# Patient Record
(demographics unavailable — no encounter records)

---

## 2024-10-31 NOTE — HISTORY OF PRESENT ILLNESS
[FreeTextEntry1] : Telemedicine visit: Patient Location at time of Video Visit: Dhara and her parents were home during the visit. Physician Location at time of Visit: [address] [place] [address] [address] [place] [address] Other Participants Present: None  I obtained parents consent and agreement for this video encounter. Additionally, I reviewed with the parents and addressed all questions regarding the disposition plan and follow-up instructions including any pertinent findings. The parents have acknowledged understanding of this information. I informed the parents that a copy of their after-visit summary for this visit is available for them to refer to within the secure patient portal.  CC: 6 y 1 mo old girl with ADHD, sleep difficulties,  and enuresis. Telemedicine follow up visit.  Interval history; Since last seen by primary Neurologist (Dr Pugh) Dhara has been doing much better. Parents refer that the morning dose of the generic liquid Methylphenidate has been working very well, but the afternoon dose seems to be little bit low (mom increased the dose and saw benefits). No side effects. Academically, Dhara is in 1st grade, mainstream classroom, 17:1:2 ratio. She rides the school bus for about 35 minutes. She is making new friends and acquiring academic skills. General health has been good. Mom refers that the enuresis is less frequent (she has been evaluated by Peds Urologist).  Sleep is still of concern. She falls asleep around 9 PM and wakes up around 7 AM but wakes up several nights a week (usually around 3 AM). Some nights, she needs a long time to fall asleep again. Parents refer that Melatonin helps her fall asleep but does not prevent her from waking up.  Current CNS medications Generic liquid Methylphenidate (5 mg/5ml) 2.5 ml QAM 3 ml at 3:30 PM Melatonin supplementation  Physical exam: Limited, on account of visit mode (video) Well nourished, non dysmorphic little girl in no distress Face is symmetric Neck has full range of motion. No torticollis or webbing Hair has normal consistency, appearance, distribution Awake, alert, good eye contact Speaks in sentences Follows simple commands well Fidgety Normal muscle bulk   No focal weakness No dysmetria No ataxia No abnormal movements Intact gait  Assessment: 6 y 1 mo old girl with ADHD, sleep difficulties,  and enuresis. Exhibiting improvements on generic liquid Methylphenidate, without side effects.  Plan: Dhara's televisit today had a duration of 30 minutes (>50% of which was spent in direct counseling and coordination of her care). I personally reviewed Dhara's medical history, medical records, tests results, recent developments, and then delineated next steps for her neurological care.  Dhara's parents and I reviewed ADHD in general, different treatment modalities, co morbidities and overall prognosis.   We reviewed generic liquid Methylphenidate's side effects profile. We discussed trying delayed release Melatonin supplementation to enhance sleep. 1)	Continue generic liquid Methylphenidate (5 mg/5 ml) at 2.5 ml QAM 3 ml at 3:30 PM 2)	Extended release/delayed release Melatonin 3)	Follow up 3 mo (with Dr Pugh)  Dhara's parents understand plan, agree and want to move forward. All of their questions were answered. Dhara's controlled substance history was obtained from the [place] state prescription monitoring program registry.  Loren Davila MD Pediatric Neurologist and Clinical Neurophysiologist Director Pediatric Epilepsy [facility] at [facility] Clinical Professor of Neurology and Pediatrics, [facility] at [facility]

## 2025-02-03 NOTE — HISTORY OF PRESENT ILLNESS
[FreeTextEntry1] : Telemedicine visit:   Patient Location at time of Video Visit:  Dhara and their parents were home during the visit.   Physician Location at time of Visit:  [address] [place] [address] [address] [place] [id number]    I obtained parent consent and agreement for this video encounter.   Additionally, I reviewed with the parent and addressed all questions regarding the disposition plan and follow-up instructions including any pertinent findings. The parent has acknowledged understanding of this information. I informed the parent that a copy of their after-visit summary for this visit is available for them to refer to within the secure patient portal.   This is a 6 year old girl with combination type ADHD and urinary incontinence here for follow up.   Interval Hx: Since last visit Dhara saw Dr. Davila and slightly uptitrated the Methylin, specifically afternoon dose to help with after school activities. Parents are overall happy with how she has been doing. School she is doing well. Dhara has been doing very well at Math and her teacher was giving her extra homework to help challenge her. They have been trying to implement routine at home which was initially more effective. She also has been doing [employer]) after school which helps her get energy out and emotions. They teach a lot of mindfulness there which helps Dhara. They are starting swimming again. Incontinence has also been improving significantly as well.  They have tried extended release melatonin which has helped a little. Dhara tends to wake up early 1-2 times a week and gets out of bed which is better than every night. They have tried a Tuney box Toe walking has improved, mom thinks it more on the right toe. No side effects from the medication but Dhara does complain about the flavor and texture of the liquid formulation.   Initial HPI: Parents state that Dhara has always had a lot of energy and has been a very active child. She is highly distractable, needs constant redirection for staying on task and repeated reminders. She is very hyperfocused on whatever she is playing with and is constantly doing something, to the point that she has frequent incontinence because she does not make it to the bathroom and sometimes does not realize she peed. They have seen urologist and GI for this. They saw a  but they are not able to take on as case for 6 months. They also saw a neurologist a few weeks ago but they did not jive well with him. They did an EEG but did not get the results yet.   Sleep: was previously waking up at 3 AM and playing with toys, now they have kept her up longer and she is sleeping though the night more frequently.   Past medical history:  admitted for RSV, constipation, enuresis    Allergies: NKDA  Current Medications: Continue generic liquid Methylphenidate (5 mg/5 ml) at 2.5 ml QAM 3 ml at 3:30 PM Extended release/delayed release Melatonin 5 mg QHS Chamomile and magnesium gummy  Birth history: Born full term, planned C section due to recent myomectomy, BW 7lbs 4 oz. IVF egg donor. Mom had a miscarriage at 21 weeks, mom had fibroids and cervical insufficiency.  Developmental history:  Walked at 11 months. Talked around a year. No concerns from parents.   Family History:  Dad has generalized anxiety disorder, he also has some hyperfocus behaviors, dyslexia. Dad side history of Alzheimer's. Nothing noted from egg donor side of family.   Social history:   Lives with mom and dad, 2 cats. First grade in a regular class with 16 children, she has a wiggle seat, she is able to get movement activities when she needs it.   Neuroinvestigations:  Vanderbilts 6/9 in both domains from teachers and parents EEG 2023 outside neurologist reported normal   ROS:  As per HPI. Denies constitutional, GI symptoms. No rashes. No headaches, no head injury. No corrective eyeglasses. No cough, SOB. No joint inflammation or arthralgia.   Physical Exam:  HC 50cm General: Well nourished, no dysmorphic features. In no acute distress. Normocephalic. No neurocutaneous stigmata.  Mental status: Alert, attentive to examiner. Can follow simple commands  Answers questions appropriately. Normal language for age. Hyperactve during exam with poor attention Cranial Nerves: EOM intact in all directions. No nystagmus,  facial activation full and symmetric  Motor: no focal weakness Gait/Coordination: no ataxaia  Assessment:  Dhara's visit today had a duration of 30 minutes (>50% of which was spent in direct counseling and coordination of their care). I personally reviewed all pertinent aspects of her medical history, outside medical records, test results, recent developments, and then delineated next steps for their neurological care. Dhara is a 6 year old girl with combination type ADHD and urinary incontinence here for follow up. Parents are happy with medication effect but would like to try chewable formulation as Dhara does not like the flavor and texture. We also discussed other behavioral interventions to try especially with sleep.    Plan:   Tri methylin chewable 2mg every morning and 2.5 mg every afternoon C/w ER melatonin 5 mg QHS C/w school and behavioral modifications at home  Follow up 2 months in person  Nicky Pugh DO  of Neurology Pediatric Neurology and Pediatric Epilepsy, [facility]

## 2025-03-26 NOTE — HISTORY OF PRESENT ILLNESS
[FreeTextEntry1] : This is a 6 year old girl with combination type ADHD and urinary incontinence here for follow up.   Interval Hx: Dhara has been doing well since last visit. We switched to chewables and she likes it better, parents also happy with how she is doing. No side effects noted but biggest difficulty right now is leep. She will get up most nights middle of the night and wont go back to sleep, she is often hungry and wants to eat. They are going to start working with a behavioral therapist. School also happy with how she is doing.   Initial HPI: Parents state that Dhara has always had a lot of energy and has been a very active child. She is highly distractable, needs constant redirection for staying on task and repeated reminders. She is very hyperfocused on whatever she is playing with and is constantly doing something, to the point that she has frequent incontinence because she does not make it to the bathroom and sometimes does not realize she peed. They have seen urologist and GI for this. They saw a  but they are not able to take on as case for 6 months. They also saw a neurologist a few weeks ago but they did not jive well with him. They did an EEG but did not get the results yet.   Sleep: was previously waking up at 3 AM and playing with toys, now they have kept her up longer and she is sleeping though the night more frequently.   Past medical history:  admitted for RSV, constipation, enuresis    Allergies: NKDA  Current Medications:  Methylphenidate 2.5 mg 1 tab QAM 1.5 tabs Q afternoon Extended release/delayed release Melatonin 5 mg QHS Chamomile and magnesium gummy  Birth history: Born full term, planned C section due to recent myomectomy, BW 7lbs 4 oz. IVF egg donor. Mom had a miscarriage at 21 weeks, mom had fibroids and cervical insufficiency.  Developmental history:  Walked at 11 months. Talked around a year. No concerns from parents.   Family History:  Dad has generalized anxiety disorder, he also has some hyperfocus behaviors, dyslexia. Dad side history of Alzheimer's. Nothing noted from egg donor side of family.   Social history:   Lives with mom and dad, 2 cats. First grade in a regular class with 16 children, she has a wiggle seat, she is able to get movement activities when she needs it.   Neuroinvestigations:  Monahansbilts 6/9 in both domains from teachers and parents EEG 2023 outside neurologist reported normal   ROS:  As per HPI. Denies constitutional, GI symptoms. No rashes. No headaches, no head injury. No corrective eyeglasses. No cough, SOB. No joint inflammation or arthralgia.   Physical Exam:  HC 50cm General: Well nourished, no dysmorphic features. In no acute distress. Normocephalic. No neurocutaneous stigmata.  Mental status: Alert, attentive to examiner. Can follow simple commands  Answers questions appropriately. Normal language for age. Hyperactve during exam with poor attention Cranial Nerves: EOM intact in all directions. No nystagmus,  facial activation full and symmetric  Motor: tone normal, no focal weakness Sensory: intact to LT all 4 extremities Gait/Coordination: no ataxia, FFM symmetric, gait normal  Assessment:  Dhara's visit today had a duration of 30 minutes (>50% of which was spent in direct counseling and coordination of their care). I personally reviewed all pertinent aspects of her medical history, outside medical records, test results, recent developments, and then delineated next steps for their neurological care. Dhara is a 6 year old girl with combination type ADHD and urinary incontinence here for follow up. Doing well current regimen but having sleep difficulties despite behavioral modifications, mom interested in trying a PRN clonidine to help.    Plan:  C/w methylin chewable 2.5 mg QAM and 3.75 mg in the afternoon C/w ER melatonin 5 mg QHS C/w school and behavioral modifications at home add clonidine 0.1 mg QHS prn for sleep, may also help behavior  Follow up 3 months virtual  Nicky Pugh DO  of Neurology Pediatric Neurology and Pediatric Epilepsy, [facility]

## 2025-06-24 NOTE — HISTORY OF PRESENT ILLNESS
[FreeTextEntry1] : Telemedicine visit:  Patient Location at time of Video Visit: Dhara and their parents were home during the visit.  Physician Location at time of Visit: [address] [place] [address] [address] [place] [address]  I obtained parent consent and agreement for this video encounter.  Additionally, I reviewed with the parent and addressed all questions regarding the disposition plan and follow-up instructions including any pertinent findings. The parent has acknowledged understanding of this information. I informed the parent that a copy of their after-visit summary for this visit is available for them to refer to within the secure patient portal.  This is a 6 year old girl with combination type ADHD and urinary incontinence called for follow up.   Interval Hx: Dhara has been doing okay since last visit. Medication wise has been doing well. They have only used clonidine a few times for sleep and it has been effective. Having some more incontinence recently, some of that seems behavioral. They have an appointment with a behavioral pediatrician within the next month. They are also going to meet with the behavioral therapist as well.   Initial HPI: Parents state that Dhara has always had a lot of energy and has been a very active child. She is highly distractable, needs constant redirection for staying on task and repeated reminders. She is very hyperfocused on whatever she is playing with and is constantly doing something, to the point that she has frequent incontinence because she does not make it to the bathroom and sometimes does not realize she peed. They have seen urologist and GI for this. They saw a  but they are not able to take on as case for 6 months. They also saw a neurologist a few weeks ago but they did not jive well with him. They did an EEG but did not get the results yet.   Sleep: was previously waking up at 3 AM and playing with toys, now they have kept her up longer and she is sleeping though the night more frequently.   Past medical history:  admitted for RSV, constipation, enuresis    Allergies: NKDA  Current Medications:  Methylphenidate 2.5 mg 1 tab QAM 1.5 tabs Q afternoon clonidine 0.1 mg QHS prn for sleep Extended release/delayed release Melatonin 5 mg QHS Chamomile and magnesium gummy  Birth history: Born full term, planned C section due to recent myomectomy, BW 7lbs 4 oz. IVF egg donor. Mom had a miscarriage at 21 weeks, mom had fibroids and cervical insufficiency.  Developmental history:  Walked at 11 months. Talked around a year. No concerns from parents.   Family History:  Dad has generalized anxiety disorder, he also has some hyperfocus behaviors, dyslexia. Dad side history of Alzheimer's. Nothing noted from egg donor side of family.   Social history:   Lives with mom and dad, 2 cats. First grade in a regular class with 16 children, she has a wiggle seat, she is able to get movement activities when she needs it.   Neuroinvestigations:  Vanderbilts 6/9 in both domains from teachers and parents EEG 2023 outside neurologist reported normal   ROS:  As per HPI. Denies constitutional, GI symptoms. No rashes. No headaches, no head injury. No corrective eyeglasses. No cough, SOB. No joint inflammation or arthralgia.   Physical Exam: from prior visit: HC 50cm General: Well nourished, no dysmorphic features. In no acute distress. Normocephalic. No neurocutaneous stigmata.  Mental status: Alert, attentive to examiner. Can follow simple commands  Answers questions appropriately. Normal language for age. Hyperactve during exam with poor attention Cranial Nerves: EOM intact in all directions. No nystagmus,  facial activation full and symmetric  Motor: tone normal, no focal weakness Sensory: intact to LT all 4 extremities Gait/Coordination: no ataxia, FFM symmetric, gait normal  Assessment:  Dhara's visit today had a duration of 30 minutes (>50% of which was spent in direct counseling and coordination of their care). I personally reviewed all pertinent aspects of her medical history, outside medical records, test results, recent developments, and then delineated next steps for their neurological care. Dhara is a 6 year old girl with combination type ADHD and urinary incontinence here for follow up. No medication changes indicated right now.    Plan:  C/w methylin chewable 2.5 mg QAM and 3.75 mg in the afternoon C/w ER melatonin 5 mg QHS C/w school and behavioral modifications at home C/w clonidine 0.1 mg QHS prn for sleep  Follow up 2 months in person  Nicky Pugh DO  of Neurology Pediatric Neurology and Pediatric Epilepsy, [facility]